# Patient Record
Sex: FEMALE | Race: WHITE | URBAN - METROPOLITAN AREA
[De-identification: names, ages, dates, MRNs, and addresses within clinical notes are randomized per-mention and may not be internally consistent; named-entity substitution may affect disease eponyms.]

---

## 2017-01-10 ENCOUNTER — CLINICAL SUPPORT (OUTPATIENT)
Dept: PRIMARY CARE CLINIC | Age: 22
End: 2017-01-10

## 2017-01-10 DIAGNOSIS — Z11.1 PPD SCREENING TEST: Primary | ICD-10-CM

## 2017-01-10 NOTE — PROGRESS NOTES
VORB, per Gabi Flores; after obtaining consent form from pt, PPD/TB injection given at 0.1ml into L antecubital subq. Administered by LPN RR. Pt advised to return to clinic for ppd reading no later than 48hrs. Pt verbalized understanding of information discussed.

## 2017-01-10 NOTE — LETTER
1/12/2017 1:35 PM 
 
Ms. Sobia Brown [address] [place] [address].[phone] Lovetta Lesch came in for a PPD placement on 1/10/17. 0.1 ml of Tuberculin solution was placed in the left antecubital space. Elva came back in for PPD reading on 1/12/17 at 1:35PM and the reading was negative. Please call the office if you have any questions at [phone] Sincerely, 
 
 
Cuba Ayala, NP

## 2017-01-12 LAB
MM INDURATION POC: 0 MM (ref 0–5)
PPD POC: NEGATIVE NEGATIVE

## 2017-04-06 ENCOUNTER — OFFICE VISIT (OUTPATIENT)
Dept: PRIMARY CARE CLINIC | Age: 22
End: 2017-04-06

## 2017-04-06 VITALS
BODY MASS INDEX: 22.38 KG/M2 | SYSTOLIC BLOOD PRESSURE: 110 MMHG | WEIGHT: 111 LBS | TEMPERATURE: 97.5 F | HEART RATE: 89 BPM | DIASTOLIC BLOOD PRESSURE: 74 MMHG | RESPIRATION RATE: 18 BRPM | OXYGEN SATURATION: 98 % | HEIGHT: 59 IN

## 2017-04-06 DIAGNOSIS — N92.6 MISSED MENSES: Primary | ICD-10-CM

## 2017-04-06 LAB
HCG URINE, QL. (POC): NEGATIVE
VALID INTERNAL CONTROL?: YES

## 2017-04-06 RX ORDER — DROSPIRENONE AND ETHINYL ESTRADIOL 0.02-3(28)
1 KIT ORAL DAILY
COMMUNITY
End: 2019-04-05 | Stop reason: SDUPTHER

## 2017-04-06 NOTE — MR AVS SNAPSHOT
Visit Information Date & Time Provider Department Dept. Phone Encounter #  
 4/6/2017  7:30  [facility] , [address] [facility] [phone] [id number] Upcoming Health Maintenance Date Due  
 HPV AGE 9Y-34Y (1 of 3 - Female 3 Dose Series) 10/24/2004 DTaP/Tdap/Td series (1 - Tdap) 10/24/2014 PAP AKA CERVICAL CYTOLOGY 10/24/2014 INFLUENZA AGE 9 TO ADULT 8/1/2016 Allergies as of 4/6/2017  Review Complete On: 4/6/2017 By: Erin Palomo MD  
 Not on File Current Immunizations  Never Reviewed Name Date  
 TB Skin Test (PPD) Intradermal 1/10/2017 Not reviewed this visit You Were Diagnosed With   
  
 Codes Comments Missed menses    -  Primary ICD-10-CM: N92.6 ICD-9-CM: 626.4 Generalized abdominal pain     ICD-10-CM: R10.84 ICD-9-CM: 789.07 Vitals BP Pulse Temp Resp Height(growth percentile) Weight(growth percentile) 110/74 (BP Patient Position: Sitting) 89 97.5 °F (36.4 °C) (Oral) 18 4' 11\" (1.499 m) 111 lb (50.3 kg) LMP SpO2 BMI OB Status Smoking Status 03/06/2017 98% 22.42 kg/m2 Having regular periods Never Smoker BMI and BSA Data Body Mass Index Body Surface Area  
 22.42 kg/m 2 1.45 m 2 Preferred Pharmacy Pharmacy Name Phone [facility] [address] [place] [address] [address] [facility], [place] [address] [phone] Your Updated Medication List  
  
   
This list is accurate as of: 4/6/17  7:52 PM.  Always use your most recent med list.  
  
  
  
  
 Ash Fuchsr (28) 3-0.02 mg Tab Generic drug:  drospirenone-ethinyl estradiol Take  by mouth daily. We Performed the Following AMB POC URINE PREGNANCY TEST, VISUAL COLOR COMPARISON [78847 CPT(R)] HCG QL SERUM [18029 CPT(R)] Patient Instructions Pregnancy Test (HCG): About This Test 
What is it? A pregnancy test can check if the hormone hCG is in your blood or urine. Your body makes this hormone when you are pregnant. Many women use home pregnancy tests to find out if they are pregnant. This care sheet focuses on a pregnancy test done in a doctor's office or clinic. Why is this test done? This test will show if you are pregnant or not. A urine test checks if hCG is in your urine. A blood test can also measure the amount of hCG. If you are pregnant, your doctor will use the test results as a guide to care for you and your growing baby. How can you prepare for the test? 
You don't need to do anything before the test. 
What happens during the test? 
A urine or blood test for pregnancy can be done in your doctor's office, clinic, or lab. · Blood test: A health professional takes a sample of your blood. · Urine test: You catch urine in a cup given to you by a health professional. When you are finished, you give the cup back. What else should you know about the test? 
· This test may not show that you are pregnant if you are very early in your pregnancy. · If your tests show you aren't pregnant, but you or your doctor thinks you may be too early in your pregnancy, you may need another test. 
How long does the test take? · The test will take just a few minutes. · Results from a urine test may be available right away. Blood test results may take a few days. What happens after the test? 
· You can go back to your usual activities right away. If you are pregnant, you will get more information from your doctor. Follow-up care is a key part of your treatment and safety. Be sure to make and go to all appointments, and call your doctor if you are having problems. It's also a good idea to keep a list of the medicines you take. Ask your doctor when you can expect to have your test results. Where can you learn more? Go to http://steffen-al.info/.  
Enter D128 in the search box to learn more about \"Pregnancy Test (HCG): About This Test.\" 
 Current as of: May 30, 2016 Content Version: 11.2 © 6812-6685 [employer]. Care instructions adapted under license by [employer] (which disclaims liability or warranty for this information). If you have questions about a medical condition or this instruction, always ask your healthcare professional. [address] any warranty or liability for your use of this information. Learning About Pregnancy Your Care Instructions Your health in the early weeks of your pregnancy is particularly important for your babys health. Take good care of yourself. Anything you do that harms your body can also harm your baby. Make sure to go to all of your doctor appointments. Regular checkups will help keep you and your baby healthy. Follow-up care is a key part of your treatment and safety. Be sure to make and go to all appointments, and call your doctor if you are having problems. Its also a good idea to know your test results and keep a list of the medicines you take. How can you care for yourself at home? Diet · Eat a balanced diet. Make sure your diet includes plenty of beans, peas, and leafy green vegetables. · Do not skip meals or go for many hours without eating. If you are nauseated, try to eat a small, healthy snack every 2 to 3 hours. · Do not eat fish that has a high level of mercury, such as shark, swordfish, or mackerel. Do not eat more than one can of tuna each week. · Drink plenty of fluids, enough so that your urine is light yellow or clear like water. If you have kidney, heart, or liver disease and have to limit fluids, talk with your doctor before you increase the amount of fluids you drink. · Cut down on caffeine, such as coffee, tea, and cola. · Do not drink alcohol, such as beer, wine, or hard liquor.  
· Take a multivitamin that contains at least 400 micrograms (mcg) of folic acid to help prevent birth defects. Fortified cereal and whole wheat bread are good additional sources of folic acid. · Increase the calcium in your diet. Try to drink a quart of skim milk each day. You may also take calcium supplements and choose foods such as cheese and yogurt. Lifestyle · Make sure you go to your follow-up appointments. · Get plenty of rest. You may be unusually tired while you are pregnant. · Get at least 30 minutes of exercise on most days of the week. Walking is a good choice. If you have not exercised in the past, start out slowly. Take several short walks each day. · Do not smoke. If you need help quitting, talk to your doctor about stop-smoking programs. These can increase your chances of quitting for good. · Do not touch cat feces or litter boxes. Also, wash your hands after you handle raw meat, and fully cook all meat before you eat it. Wear gloves when you work in the yard or garden, and wash your hands well when you are done. Cat feces, raw or undercooked meat, and contaminated dirt can cause an infection that may harm your baby or lead to a miscarriage. · Do not use saunas or hot tubs. Raising your body temperature may harm your baby. · Avoid chemical fumes, paint fumes, or poisons. · Do not use illegal drugs or alcohol. Medicines · Review all of your medicines with your doctor. Some of your routine medicines may need to be changed to protect your baby. · Use acetaminophen (Tylenol) to relieve minor problems, such as a mild headache or backache or a mild fever with cold symptoms. Do not use nonsteroidal anti-inflammatory drugs (NSAIDs), such as ibuprofen (Advil, Motrin) or naproxen (Aleve), unless your doctor says it is okay. · Do not take two or more pain medicines at the same time unless the doctor told you to. Many pain medicines have acetaminophen, which is Tylenol. Too much acetaminophen (Tylenol) can be harmful. · Take your medicines exactly as prescribed. Call your doctor if you think you are having a problem with your medicine. To manage morning sickness · If you feel sick when you first wake up, try eating a small snack (such as crackers) before you get out of bed. Allow some time to digest the snack, and then get out of bed slowly. · Do not skip meals or go for long periods without eating. An empty stomach can make nausea worse. · Eat small, frequent meals instead of three large meals each day. · Drink plenty of fluids. Sports drinks, such as Gatorade or Powerade, are good choices. · Eat foods that are high in protein but low in fat. · If you are taking iron supplements, ask your doctor if they are necessary. Iron can make nausea worse. · Avoid any smells, such as coffee, that make you feel sick. · Get lots of rest. Morning sickness may be worse when you are tired. Where can you learn more? Go to http://steffen-al.info/. Enter K438 in the search box to learn more about \"Learning About Pregnancy. \" Current as of: May 30, 2016 Content Version: 11.2 © 6961-6810 [employer]. Care instructions adapted under license by [employer] (which disclaims liability or warranty for this information). If you have questions about a medical condition or this instruction, always ask your healthcare professional. [address] any warranty or liability for your use of this information. Introducing [facility]! Sherrie Smith introduces [facility] patient portal. Now you can access parts of your medical record, email your doctor's office, and request medication refills online. 1. In your internet browser, go to [email or web address] 2. Click on the First Time User? Click Here link in the Sign In box. You will see the New Member Sign Up page. 3. Enter your [facility] Access Code exactly as it appears below.  You will not need to use this code after youve completed the sign-up process. If you do not sign up before the expiration date, you must request a new code. · [facility] Access Code: [email or web address] Expires: 7/5/2017  7:45 PM 
 
4. Enter the last four digits of your Social Security Number (xxxx) and Date of Birth (mm/dd/yyyy) as indicated and click Submit. You will be taken to the next sign-up page. 5. Create a [facility] ID. This will be your [facility] login ID and cannot be changed, so think of one that is secure and easy to remember. 6. Create a [facility] password. You can change your password at any time. 7. Enter your Password Reset Question and Answer. This can be used at a later time if you forget your password. 8. Enter your e-mail address. You will receive e-mail notification when new information is available in [address]. 9. Click Sign Up. You can now view and download portions of your medical record. 10. Click the Download Summary menu link to download a portable copy of your medical information. If you have questions, please visit the Frequently Asked Questions section of the [employer] website. Remember, [facility] is NOT to be used for urgent needs. For medical emergencies, dial 911. Now available from your iPhone and Android! Please provide this summary of care documentation to your next provider. If you have any questions after today's visit, please call [phone].

## 2017-04-06 NOTE — PROGRESS NOTES
Subjective:     Haydee Snyder is a 19 y.o. female who is being seen today for possible pregnancy due to missed menses. She noticed swollen breasts, abdominal cramps, bloating, fatigue, nausea for 2 weeks. LMP was 3/6 and usually regularly. She is on OCPs but had unprotected sex before starting her current pack. She is , had a miscarriage 2 yrs ago. She is not taking any prenatal vitamins. Objective:     Blood pressure 110/74, pulse 89, temperature 97.5 °F (36.4 °C), temperature source Oral, resp. rate 18, height 4' 11\" (1.499 m), weight 111 lb (50.3 kg), last menstrual period 2017, SpO2 98 %. General - NAD, alert and oriented  Cardiovascular - RRR, normal S1 and S2 no murmur, gallops or  rubs  Respiratory - CTAB, no wheezes, rales or rhonchi  Abdominal - positive bowel sounds, soft, non-tender, no rebound, guarding  Extremities - no edema, cyanosis, 2+ peripheral pulses      Assessment:       ICD-10-CM ICD-9-CM    1. Missed menses N92.6 626.4 AMB POC URINE PREGNANCY TEST, VISUAL COLOR COMPARISON      drospirenone-ethinyl estradiol (LORYNA, 28,) 3-0.02 mg tab      HCG QL SERUM     UPT negative in office. Discussed that early urine test may be falsely negative. Offered to repeat in one week but patient requested serum confirmation. Advised to start prenatal vitamins while awaiting confirmation.

## 2017-04-06 NOTE — PATIENT INSTRUCTIONS
Pregnancy Test (HCG): About This Test  What is it? A pregnancy test can check if the hormone hCG is in your blood or urine. Your body makes this hormone when you are pregnant. Many women use home pregnancy tests to find out if they are pregnant. This care sheet focuses on a pregnancy test done in a doctor's office or clinic. Why is this test done? This test will show if you are pregnant or not. A urine test checks if hCG is in your urine. A blood test can also measure the amount of hCG. If you are pregnant, your doctor will use the test results as a guide to care for you and your growing baby. How can you prepare for the test?  You don't need to do anything before the test.  What happens during the test?  A urine or blood test for pregnancy can be done in your doctor's office, clinic, or lab. · Blood test: A health professional takes a sample of your blood. · Urine test: You catch urine in a cup given to you by a health professional. When you are finished, you give the cup back. What else should you know about the test?  · This test may not show that you are pregnant if you are very early in your pregnancy. · If your tests show you aren't pregnant, but you or your doctor thinks you may be too early in your pregnancy, you may need another test.  How long does the test take? · The test will take just a few minutes. · Results from a urine test may be available right away. Blood test results may take a few days. What happens after the test?  · You can go back to your usual activities right away. If you are pregnant, you will get more information from your doctor. Follow-up care is a key part of your treatment and safety. Be sure to make and go to all appointments, and call your doctor if you are having problems. It's also a good idea to keep a list of the medicines you take. Ask your doctor when you can expect to have your test results. Where can you learn more?   Go to http://steffen-al.info/. Enter D128 in the search box to learn more about \"Pregnancy Test (HCG): About This Test.\"  Current as of: May 30, 2016  Content Version: 11.2  © 8568-6325 [employer]. Care instructions adapted under license by [employer] (which disclaims liability or warranty for this information). If you have questions about a medical condition or this instruction, always ask your healthcare professional. [address] any warranty or liability for your use of this information. Learning About Pregnancy  Your Care Instructions  Your health in the early weeks of your pregnancy is particularly important for your babys health. Take good care of yourself. Anything you do that harms your body can also harm your baby. Make sure to go to all of your doctor appointments. Regular checkups will help keep you and your baby healthy. Follow-up care is a key part of your treatment and safety. Be sure to make and go to all appointments, and call your doctor if you are having problems. Its also a good idea to know your test results and keep a list of the medicines you take. How can you care for yourself at home? Diet  · Eat a balanced diet. Make sure your diet includes plenty of beans, peas, and leafy green vegetables. · Do not skip meals or go for many hours without eating. If you are nauseated, try to eat a small, healthy snack every 2 to 3 hours. · Do not eat fish that has a high level of mercury, such as shark, swordfish, or mackerel. Do not eat more than one can of tuna each week. · Drink plenty of fluids, enough so that your urine is light yellow or clear like water. If you have kidney, heart, or liver disease and have to limit fluids, talk with your doctor before you increase the amount of fluids you drink. · Cut down on caffeine, such as coffee, tea, and cola. · Do not drink alcohol, such as beer, wine, or hard liquor.   · Take a multivitamin that contains at least 400 micrograms (mcg) of folic acid to help prevent birth defects. Fortified cereal and whole wheat bread are good additional sources of folic acid. · Increase the calcium in your diet. Try to drink a quart of skim milk each day. You may also take calcium supplements and choose foods such as cheese and yogurt. Lifestyle  · Make sure you go to your follow-up appointments. · Get plenty of rest. You may be unusually tired while you are pregnant. · Get at least 30 minutes of exercise on most days of the week. Walking is a good choice. If you have not exercised in the past, start out slowly. Take several short walks each day. · Do not smoke. If you need help quitting, talk to your doctor about stop-smoking programs. These can increase your chances of quitting for good. · Do not touch cat feces or litter boxes. Also, wash your hands after you handle raw meat, and fully cook all meat before you eat it. Wear gloves when you work in the yard or garden, and wash your hands well when you are done. Cat feces, raw or undercooked meat, and contaminated dirt can cause an infection that may harm your baby or lead to a miscarriage. · Do not use saunas or hot tubs. Raising your body temperature may harm your baby. · Avoid chemical fumes, paint fumes, or poisons. · Do not use illegal drugs or alcohol. Medicines  · Review all of your medicines with your doctor. Some of your routine medicines may need to be changed to protect your baby. · Use acetaminophen (Tylenol) to relieve minor problems, such as a mild headache or backache or a mild fever with cold symptoms. Do not use nonsteroidal anti-inflammatory drugs (NSAIDs), such as ibuprofen (Advil, Motrin) or naproxen (Aleve), unless your doctor says it is okay. · Do not take two or more pain medicines at the same time unless the doctor told you to. Many pain medicines have acetaminophen, which is Tylenol.  Too much acetaminophen (Tylenol) can be harmful. · Take your medicines exactly as prescribed. Call your doctor if you think you are having a problem with your medicine. To manage morning sickness  · If you feel sick when you first wake up, try eating a small snack (such as crackers) before you get out of bed. Allow some time to digest the snack, and then get out of bed slowly. · Do not skip meals or go for long periods without eating. An empty stomach can make nausea worse. · Eat small, frequent meals instead of three large meals each day. · Drink plenty of fluids. Sports drinks, such as Gatorade or Powerade, are good choices. · Eat foods that are high in protein but low in fat. · If you are taking iron supplements, ask your doctor if they are necessary. Iron can make nausea worse. · Avoid any smells, such as coffee, that make you feel sick. · Get lots of rest. Morning sickness may be worse when you are tired. Where can you learn more? Go to http://steffen-al.info/. Enter Q950 in the search box to learn more about \"Learning About Pregnancy. \"  Current as of: May 30, 2016  Content Version: 11.2  © 7101-1848 [employer]. Care instructions adapted under license by [employer] (which disclaims liability or warranty for this information). If you have questions about a medical condition or this instruction, always ask your healthcare professional. [address] any warranty or liability for your use of this information.

## 2017-04-08 LAB — B-HCG SERPL QL: NEGATIVE MIU/ML

## 2017-04-10 NOTE — PROGRESS NOTES
Called and spoke with pt x 2 identifiers,advised of negative pregnancy test, voiced understanding and had no further questions at this time

## 2017-07-10 ENCOUNTER — OFFICE VISIT (OUTPATIENT)
Dept: PRIMARY CARE CLINIC | Age: 22
End: 2017-07-10

## 2017-07-10 VITALS
WEIGHT: 114.2 LBS | RESPIRATION RATE: 16 BRPM | HEART RATE: 85 BPM | DIASTOLIC BLOOD PRESSURE: 71 MMHG | TEMPERATURE: 99.4 F | SYSTOLIC BLOOD PRESSURE: 107 MMHG | BODY MASS INDEX: 23.97 KG/M2 | OXYGEN SATURATION: 98 % | HEIGHT: 58 IN

## 2017-07-10 DIAGNOSIS — N30.01 ACUTE CYSTITIS WITH HEMATURIA: Primary | ICD-10-CM

## 2017-07-10 LAB
BILIRUB UR QL STRIP: NEGATIVE
GLUCOSE UR-MCNC: NEGATIVE MG/DL
HCG URINE, QL. (POC): NEGATIVE
KETONES P FAST UR STRIP-MCNC: NEGATIVE MG/DL
PH UR STRIP: 7 [PH] (ref 4.6–8)
PROT UR QL STRIP: NEGATIVE MG/DL
SP GR UR STRIP: 1.01 (ref 1–1.03)
UA UROBILINOGEN AMB POC: NORMAL (ref 0.2–1)
URINALYSIS CLARITY POC: CLEAR
URINALYSIS COLOR POC: YELLOW
URINE BLOOD POC: NORMAL
URINE LEUKOCYTES POC: NORMAL
URINE NITRITES POC: NEGATIVE
VALID INTERNAL CONTROL?: YES

## 2017-07-10 RX ORDER — SULFAMETHOXAZOLE AND TRIMETHOPRIM 800; 160 MG/1; MG/1
1 TABLET ORAL 2 TIMES DAILY
Qty: 14 TAB | Refills: 0 | Status: SHIPPED | OUTPATIENT
Start: 2017-07-10 | End: 2017-07-17

## 2017-07-10 RX ORDER — VARENICLINE TARTRATE 0.5 (11)-1
KIT ORAL
Refills: 0 | COMMUNITY
Start: 2017-05-24 | End: 2017-07-10 | Stop reason: ALTCHOICE

## 2017-07-10 NOTE — MR AVS SNAPSHOT
Visit Information Date & Time Provider Department Dept. Phone Encounter #  
 7/10/2017  5:45 PM Marti Von, [phone] [id number] Follow-up Instructions Return if symptoms worsen or fail to improve. Upcoming Health Maintenance Date Due  
 HPV AGE 9Y-34Y (1 of 3 - Female 3 Dose Series) 10/24/2004 DTaP/Tdap/Td series (1 - Tdap) 10/24/2014 PAP AKA CERVICAL CYTOLOGY 10/24/2014 INFLUENZA AGE 9 TO ADULT 8/1/2017 Allergies as of 7/10/2017  Review Complete On: 7/10/2017 By: Anjana Young LPN No Known Allergies Current Immunizations  Never Reviewed Name Date  
 TB Skin Test (PPD) Intradermal 1/10/2017 Not reviewed this visit You Were Diagnosed With   
  
 Codes Comments Acute cystitis with hematuria    -  Primary ICD-10-CM: N30.01 
ICD-9-CM: 595.0 Vitals BP Pulse Temp Resp Height(growth percentile) Weight(growth percentile) 107/71 (BP 1 Location: Left arm, BP Patient Position: Sitting) 85 99.4 °F (37.4 °C) (Oral) 16 4' 10\" (1.473 m) 114 lb 3.2 oz (51.8 kg) LMP SpO2 BMI OB Status Smoking Status 06/10/2017 98% 23.87 kg/m2 Having regular periods Never Smoker Vitals History BMI and BSA Data Body Mass Index Body Surface Area  
 23.87 kg/m 2 1.46 m 2 Preferred Pharmacy Pharmacy Name Phone [phone] [phone] Your Updated Medication List  
  
   
This list is accurate as of: 7/10/17  7:02 PM.  Always use your most recent med list.  
  
  
  
  
 Chrissy Taveras (26) 3-0.02 mg Tab Generic drug:  drospirenone-ethinyl estradiol Take 1 Tab by mouth daily. trimethoprim-sulfamethoxazole 160-800 mg per tablet Commonly known as:  BACTRIM DS, SEPTRA DS Take 1 Tab by mouth two (2) times a day for 7 days. Prescriptions Sent to Pharmacy Refills trimethoprim-sulfamethoxazole (BACTRIM DS, SEPTRA DS) 160-800 mg per tablet 0 Sig: Take 1 Tab by mouth two (2) times a day for 7 days. Class: Normal  
 Pharmacy: [place] [id number], [address]  #: [phone] Route: Oral  
  
We Performed the Following REFERRAL TO PRIMARY CARE [JGS577 CPT(R)] Comments:  
 Evaluate for PCP Care Follow-up Instructions Return if symptoms worsen or fail to improve. Referral Information Referral ID Referred By Referred To  
  
 [id number], [address] [facility]MD [place]. [address] [place], [facility] [address] [facility] Phone: [phone] Fax: [phone] Visits Status Start Date End Date 1 New Request 7/10/17 7/10/18 If your referral has a status of pending review or denied, additional information will be sent to support the outcome of this decision. Patient Instructions Urinary Tract Infection in Women: Care Instructions Your Care Instructions A urinary tract infection, or UTI, is a general term for an infection anywhere between the kidneys and the urethra (where urine comes out). Most UTIs are bladder infections. They often cause pain or burning when you urinate. UTIs are caused by bacteria and can be cured with antibiotics. Be sure to complete your treatment so that the infection goes away. Follow-up care is a key part of your treatment and safety. Be sure to make and go to all appointments, and call your doctor if you are having problems. It's also a good idea to know your test results and keep a list of the medicines you take. How can you care for yourself at home? · Take your antibiotics as directed. Do not stop taking them just because you feel better. You need to take the full course of antibiotics. · Drink extra water and other fluids for the next day or two.  This may help wash out the bacteria that are causing the infection. (If you have kidney, heart, or liver disease and have to limit fluids, talk with your doctor before you increase your fluid intake.) · Avoid drinks that are carbonated or have caffeine. They can irritate the bladder. · Urinate often. Try to empty your bladder each time. · To relieve pain, take a hot bath or lay a heating pad set on low over your lower belly or genital area. Never go to sleep with a heating pad in place. To prevent UTIs · Drink plenty of water each day. This helps you urinate often, which clears bacteria from your system. (If you have kidney, heart, or liver disease and have to limit fluids, talk with your doctor before you increase your fluid intake.) · Urinate when you need to. · Urinate right after you have sex. · Change sanitary pads often. · Avoid douches, bubble baths, feminine hygiene sprays, and other feminine hygiene products that have deodorants. · After going to the bathroom, wipe from front to back. When should you call for help? Call your doctor now or seek immediate medical care if: · Symptoms such as fever, chills, nausea, or vomiting get worse or appear for the first time. · You have new pain in your back just below your rib cage. This is called flank pain. · There is new blood or pus in your urine. · You have any problems with your antibiotic medicine. Watch closely for changes in your health, and be sure to contact your doctor if: 
· You are not getting better after taking an antibiotic for 2 days. · Your symptoms go away but then come back. Where can you learn more? Go to http://steffen-al.info/. Enter D518 in the search box to learn more about \"Urinary Tract Infection in Women: Care Instructions. \" Current as of: November 28, 2016 Content Version: 11.3 © 9493-7312 [employer], [employer].  Care instructions adapted under license by [address] (which disclaims liability or warranty for this information). If you have questions about a medical condition or this instruction, always ask your healthcare professional. [address] any warranty or liability for your use of this information. Introducing [facility]! Willadean Saint introduces [employer] patient portal. Now you can access parts of your medical record, email your doctor's office, and request medication refills online. 1. In your internet browser, go to [email or web address] 2. Click on the First Time User? Click Here link in the Sign In box. You will see the New Member Sign Up page. 3. Enter your [employer] Access Code exactly as it appears below. You will not need to use this code after youve completed the sign-up process. If you do not sign up before the expiration date, you must request a new code. · [employer] Access Code: [email or web address] Expires: 10/8/2017  7:02 PM 
 
4. Enter the last four digits of your Social Security Number (xxxx) and Date of Birth (mm/dd/yyyy) as indicated and click Submit. You will be taken to the next sign-up page. 5. Create a [employer] ID. This will be your [employer] login ID and cannot be changed, so think of one that is secure and easy to remember. 6. Create a [employer] password. You can change your password at any time. 7. Enter your Password Reset Question and Answer. This can be used at a later time if you forget your password. 8. Enter your e-mail address. You will receive e-mail notification when new information is available in [address] 9. Click Sign Up. You can now view and download portions of your medical record. 10. Click the Download Summary menu link to download a portable copy of your medical information. If you have questions, please visit the Frequently Asked Questions section of the [employer] website.  Remember, [employer] is NOT to be used for urgent needs. For medical emergencies, dial 911. Now available from your iPhone and Android! Please provide this summary of care documentation to your next provider. If you have any questions after today's visit, please call [phone].

## 2017-07-10 NOTE — PROGRESS NOTES
Chief Complaint   Patient presents with    Urinary Burning     c/o dysuria, nausea and some L lower abdominal pain since last tuesday, states she has been drinking fluids to help with symptom relief, denies taking anything     Nausea     This note will not be viewable in [address]

## 2017-07-10 NOTE — PROGRESS NOTES
Subjective:     Kenya Chavez is a 19 y.o. female who complains of dysuria, frequency, urgency for 6 days. Patient denies flank pain, vomiting, fever, unusual vaginal discharge. Patient does not have a history of recurrent UTI. Patient does not have a history of pyelonephritis. There is no problem list on file for this patient. There are no active problems to display for this patient. Current Outpatient Prescriptions   Medication Sig Dispense Refill    trimethoprim-sulfamethoxazole (BACTRIM DS, SEPTRA DS) 160-800 mg per tablet Take 1 Tab by mouth two (2) times a day for 7 days. 14 Tab 0    drospirenone-ethinyl estradiol (LORYNA, 28,) 3-0.02 mg tab Take 1 Tab by mouth daily. No Known Allergies  History reviewed. No pertinent past medical history. History reviewed. No pertinent surgical history. History reviewed. No pertinent family history. Social History   Substance Use Topics    Smoking status: Never Smoker    Smokeless tobacco: Never Used    Alcohol use No        Review of Systems  Pertinent items are noted in HPI. Objective:     Visit Vitals    /71 (BP 1 Location: Left arm, BP Patient Position: Sitting)    Pulse 85    Temp 99.4 °F (37.4 °C) (Oral)    Resp 16    Ht 4' 10\" (1.473 m)    Wt 114 lb 3.2 oz (51.8 kg)    LMP 06/10/2017    SpO2 98%    BMI 23.87 kg/m2     General:  alert, cooperative, no distress   Abdomen: soft, nontender, nondistended, no masses or organomegaly. Back:  CVA tenderness absent   :  defer exam     Laboratory:   Urine dipstick shows positive for RBC's and positive for leukocytes. Micro exam: not done. Assessment/Plan:     Acute cystitis     1. TMP/SMX  2. Maintain adequate hydration  3. May use OTC pyridium as desired, which will turn urine orange/red color  4. Follow up if symptoms not improving, and prn. ICD-10-CM ICD-9-CM    1.  Acute cystitis with hematuria N30.01 595.0 trimethoprim-sulfamethoxazole (BACTRIM DS, SEPTRA DS) 160-800 mg per tablet      REFERRAL TO PRIMARY CARE   .

## 2017-07-10 NOTE — PATIENT INSTRUCTIONS
Urinary Tract Infection in Women: Care Instructions  Your Care Instructions    A urinary tract infection, or UTI, is a general term for an infection anywhere between the kidneys and the urethra (where urine comes out). Most UTIs are bladder infections. They often cause pain or burning when you urinate. UTIs are caused by bacteria and can be cured with antibiotics. Be sure to complete your treatment so that the infection goes away. Follow-up care is a key part of your treatment and safety. Be sure to make and go to all appointments, and call your doctor if you are having problems. It's also a good idea to know your test results and keep a list of the medicines you take. How can you care for yourself at home? · Take your antibiotics as directed. Do not stop taking them just because you feel better. You need to take the full course of antibiotics. · Drink extra water and other fluids for the next day or two. This may help wash out the bacteria that are causing the infection. (If you have kidney, heart, or liver disease and have to limit fluids, talk with your doctor before you increase your fluid intake.)  · Avoid drinks that are carbonated or have caffeine. They can irritate the bladder. · Urinate often. Try to empty your bladder each time. · To relieve pain, take a hot bath or lay a heating pad set on low over your lower belly or genital area. Never go to sleep with a heating pad in place. To prevent UTIs  · Drink plenty of water each day. This helps you urinate often, which clears bacteria from your system. (If you have kidney, heart, or liver disease and have to limit fluids, talk with your doctor before you increase your fluid intake.)  · Urinate when you need to. · Urinate right after you have sex. · Change sanitary pads often. · Avoid douches, bubble baths, feminine hygiene sprays, and other feminine hygiene products that have deodorants.   · After going to the bathroom, wipe from front to back.  When should you call for help? Call your doctor now or seek immediate medical care if:  · Symptoms such as fever, chills, nausea, or vomiting get worse or appear for the first time. · You have new pain in your back just below your rib cage. This is called flank pain. · There is new blood or pus in your urine. · You have any problems with your antibiotic medicine. Watch closely for changes in your health, and be sure to contact your doctor if:  · You are not getting better after taking an antibiotic for 2 days. · Your symptoms go away but then come back. Where can you learn more? Go to http://steffen-al.info/. Enter X420 in the search box to learn more about \"Urinary Tract Infection in Women: Care Instructions. \"  Current as of: November 28, 2016  Content Version: 11.3  © 6209-0831 [email or web address], [employer]. Care instructions adapted under license by [employer] (which disclaims liability or warranty for this information). If you have questions about a medical condition or this instruction, always ask your healthcare professional. [address] any warranty or liability for your use of this information.

## 2017-07-14 LAB — BACTERIA UR CULT: ABNORMAL

## 2019-02-04 ENCOUNTER — OFFICE VISIT (OUTPATIENT)
Dept: PRIMARY CARE CLINIC | Age: 24
End: 2019-02-04

## 2019-02-04 VITALS
BODY MASS INDEX: 24.01 KG/M2 | HEART RATE: 91 BPM | SYSTOLIC BLOOD PRESSURE: 100 MMHG | TEMPERATURE: 98.2 F | RESPIRATION RATE: 16 BRPM | DIASTOLIC BLOOD PRESSURE: 58 MMHG | HEIGHT: 58 IN | OXYGEN SATURATION: 98 % | WEIGHT: 114.4 LBS

## 2019-02-04 DIAGNOSIS — Z23 ENCOUNTER FOR IMMUNIZATION: ICD-10-CM

## 2019-02-04 DIAGNOSIS — Z02.0 SCHOOL PHYSICAL EXAM: Primary | ICD-10-CM

## 2019-02-04 NOTE — PROGRESS NOTES
Subjective:  
20 y.o. female for Well Woman Check. Her gyne and breast care is done elsewhere by her Ob-Gyne physician. There is no problem list on file for this patient. There are no active problems to display for this patient. Current Outpatient Medications Medication Sig Dispense Refill  drospirenone-ethinyl estradiol (LORYNA, 28,) 3-0.02 mg tab Take 1 Tab by mouth daily. No Known Allergies History reviewed. No pertinent past medical history. History reviewed. No pertinent surgical history. Family History Problem Relation Age of Onset  Suicide Mother  No Known Problems Father Social History Tobacco Use  Smoking status: Never Smoker  Smokeless tobacco: Never Used Substance Use Topics  Alcohol use: No  
  
 
 
 
ROS: Feeling generally well. No TIA's or unusual headaches, no dysphagia. No prolonged cough. No dyspnea or chest pain on exertion. No abdominal pain, change in bowel habits, black or bloody stools. No urinary tract symptoms. No new or unusual musculoskeletal symptoms. Specific concerns today: . Objective: The patient appears well, alert, oriented x 3, in no distress. Visit Vitals /58 Pulse 91 Temp 98.2 °F (36.8 °C) (Oral) Resp 16 Ht 4' 10\" (1.473 m) Wt 114 lb 6.4 oz (51.9 kg) LMP 01/21/2019 (Approximate) SpO2 98% BMI 23.91 kg/m² ENT normal.  Neck supple. No adenopathy or thyromegaly. ANDREW. Lungs are clear, good air entry, no wheezes, rhonchi or rales. S1 and S2 normal, no murmurs, regular rate and rhythm. Abdomen soft without tenderness, guarding, mass or organomegaly. Extremities show no edema, normal peripheral pulses. Neurological is normal, no focal findings. Breast and Pelvic exams are deferred. Assessment/Plan:  
 
follow low fat diet, follow low salt diet ICD-10-CM ICD-9-CM 1. School physical exam Z02.0 V70.5

## 2019-02-04 NOTE — PROGRESS NOTES
Chief Complaint Patient presents with  School/Camp Physical  
  Here for Nursing School Physical

## 2019-02-04 NOTE — PATIENT INSTRUCTIONS
Well Visit, Ages 25 to 48: Care Instructions Your Care Instructions Physical exams can help you stay healthy. Your doctor has checked your overall health and may have suggested ways to take good care of yourself. He or she also may have recommended tests. At home, you can help prevent illness with healthy eating, regular exercise, and other steps. Follow-up care is a key part of your treatment and safety. Be sure to make and go to all appointments, and call your doctor if you are having problems. It's also a good idea to know your test results and keep a list of the medicines you take. How can you care for yourself at home? · Reach and stay at a healthy weight. This will lower your risk for many problems, such as obesity, diabetes, heart disease, and high blood pressure. · Get at least 30 minutes of physical activity on most days of the week. Walking is a good choice. You also may want to do other activities, such as running, swimming, cycling, or playing tennis or team sports. Discuss any changes in your exercise program with your doctor. · Do not smoke or allow others to smoke around you. If you need help quitting, talk to your doctor about stop-smoking programs and medicines. These can increase your chances of quitting for good. · Talk to your doctor about whether you have any risk factors for sexually transmitted infections (STIs). Having one sex partner (who does not have STIs and does not have sex with anyone else) is a good way to avoid these infections. · Use birth control if you do not want to have children at this time. Talk with your doctor about the choices available and what might be best for you. · Protect your skin from too much sun. When you're outdoors from 10 a.m. to 4 p.m., stay in the shade or cover up with clothing and a hat with a wide brim. Wear sunglasses that block UV rays. Even when it's cloudy, put broad-spectrum sunscreen (SPF 30 or higher) on any exposed skin. · See a dentist one or two times a year for checkups and to have your teeth cleaned. · Wear a seat belt in the car. · Drink alcohol in moderation, if at all. That means no more than 2 drinks a day for men and 1 drink a day for women. Follow your doctor's advice about when to have certain tests. These tests can spot problems early. For everyone · Cholesterol. Have the fat (cholesterol) in your blood tested after age 21. Your doctor will tell you how often to have this done based on your age, family history, or other things that can increase your risk for heart disease. · Blood pressure. Have your blood pressure checked during a routine doctor visit. Your doctor will tell you how often to check your blood pressure based on your age, your blood pressure results, and other factors. · Vision. Talk with your doctor about how often to have a glaucoma test. 
· Diabetes. Ask your doctor whether you should have tests for diabetes. · Colon cancer. Have a test for colon cancer at age 48. You may have one of several tests. If you are younger than 48, you may need a test earlier if you have any risk factors. Risk factors include whether you already had a precancerous polyp removed from your colon or whether your parent, brother, sister, or child has had colon cancer. For women · Breast exam and mammogram. Talk to your doctor about when you should have a clinical breast exam and a mammogram. Medical experts differ on whether and how often women under 50 should have these tests. Your doctor can help you decide what is right for you. · Pap test and pelvic exam. Begin Pap tests at age 24. A Pap test is the best way to find cervical cancer. The test often is part of a pelvic exam. Ask how often to have this test. 
· Tests for sexually transmitted infections (STIs). Ask whether you should have tests for STIs. You may be at risk if you have sex with more than one person, especially if your partners do not wear condoms. For men · Tests for sexually transmitted infections (STIs). Ask whether you should have tests for STIs. You may be at risk if you have sex with more than one person, especially if you do not wear a condom. · Testicular cancer exam. Ask your doctor whether you should check your testicles regularly. · Prostate exam. Talk to your doctor about whether you should have a blood test (called a PSA test) for prostate cancer. Experts differ on whether and when men should have this test. Some experts suggest it if you are older than 39 and are -American or have a father or brother who got prostate cancer when he was younger than 72. When should you call for help? Watch closely for changes in your health, and be sure to contact your doctor if you have any problems or symptoms that concern you. Where can you learn more? Go to http://steffen-al.info/. Enter P072 in the search box to learn more about \"Well Visit, Ages 25 to 48: Care Instructions. \" Current as of: March 28, 2018 Content Version: 11.9 © 2716-3175 [employer]. Care instructions adapted under license by [employer] (which disclaims liability or warranty for this information). If you have questions about a medical condition or this instruction, always ask your healthcare professional. [place] [address] any warranty or liability for your use of this information. Vaccine Information Statement Tdap (Tetanus, Diphtheria, Pertussis) Vaccine: What You Need to Know Many Vaccine Information Statements are available in [place] and other languages. See www.immunize.org/vis. Hojas de Información Sobre Vacunas están disponibles en español y en muchos otros idiomas. Visite WorthScale.si 1. Why get vaccinated? Tetanus, diphtheria, and pertussis are very serious diseases. Tdap vaccine can protect us from these diseases.   And, Tdap vaccine given to pregnant women can protect  babies against pertussis. TETANUS (Lockjaw) is rare in the [facility] today. It causes painful muscle tightening and stiffness, usually all over the body. ? It can lead to tightening of muscles in the head and neck so you cant open your mouth, swallow, or sometimes even breathe. Tetanus kills about 1 out of 10 people who are infected even after receiving the best medical care. DIPHTHERIA is also rare in the [facility] today. It can cause a thick coating to form in the back of the throat. ? It can lead to breathing problems, heart failure, paralysis, and death. PERTUSSIS (Whooping Cough) causes severe coughing spells, which can cause difficulty breathing, vomiting, and disturbed sleep. ? It can also lead to weight loss, incontinence, and rib fractures. Up to 2 in 100 adolescents and 5 in 100 adults with pertussis are hospitalized or have complications, which could include pneumonia or death. These diseases are caused by bacteria. Diphtheria and pertussis are spread from person to person through secretions from coughing or sneezing. Tetanus enters the body through cuts, scratches, or wounds. Before vaccines, as many as 200,000 cases of diphtheria, 200,000 cases of pertussis, and hundreds of cases of tetanus, were reported in the [place] each year. Since vaccination began, reports of cases for tetanus and diphtheria have dropped by about 99% and for pertussis by about 80%. 2. Tdap vaccine Tdap vaccine can protect adolescents and adults from tetanus, diphtheria, and pertussis. One dose of Tdap is routinely given at age 6 or 15. People who did not get Tdap at that age should get it as soon as possible. Tdap is especially important for health care professionals and anyone having close contact with a baby younger than 12 months.    
 
Pregnant women should get a dose of Tdap during every pregnancy, to protect the  from pertussis. Infants are most at risk for severe, life-threatening complications from pertussis. Another vaccine, called Td, protects against tetanus and diphtheria, but not pertussis. A Td booster should be given every 10 years. Tdap may be given as one of these boosters if you have never gotten Tdap before. Tdap may also be given after a severe cut or burn to prevent tetanus infection. Your doctor or the person giving you the vaccine can give you more information. Tdap may safely be given at the same time as other vaccines. 3. Some people should not get this vaccine  A person who has ever had a life-threatening allergic reaction after a previous dose of any diphtheria, tetanus or pertussis containing vaccine, OR has a severe allergy to any part of this vaccine, should not get Tdap vaccine. Tell the person giving the vaccine about any severe allergies.  Anyone who had coma or long repeated seizures within 7 days after a childhood dose of DTP or DTaP, or a previous dose of Tdap, should not get Tdap, unless a cause other than the vaccine was found. They can still get Td.  Talk to your doctor if you: 
- have seizures or another nervous system problem, 
- had severe pain or swelling after any vaccine containing diphtheria, tetanus or pertussis,  
- ever had a condition called Guillain Barré Syndrome (GBS), 
- arent feeling well on the day the shot is scheduled. 4. Risks With any medicine, including vaccines, there is a chance of side effects. These are usually mild and go away on their own. Serious reactions are also possible but are rare. Most people who get Tdap vaccine do not have any problems with it. Mild Problems following Tdap 
(Did not interfere with activities)  Pain where the shot was given (about 3 in 4 adolescents or 2 in 3 adults)  Redness or swelling where the shot was given (about 1 person in 5)  Mild fever of at least 100.4°F (up to about 1 in 25 adolescents or 1 in 100 adults)  Headache (about 3 or 4 people in 10)  Tiredness (about 1 person in 3 or 4)  Nausea, vomiting, diarrhea, stomach ache (up to 1 in 4 adolescents or 1 in 10 adults)  Chills,  sore joints (about 1 person in 10)  Body aches (about 1 person in 3 or 4)  Rash, swollen glands (uncommon) Moderate Problems following Tdap (Interfered with activities, but did not require medical attention)  Pain where the shot was given (up to 1 in 5 or 6)  Redness or swelling where the shot was given (up to about 1 in 16 adolescents or 1 in 12 adults)  Fever over 102°F (about 1 in 100 adolescents or 1 in 250 adults)  Headache (about 1 in 7 adolescents or 1 in 10 adults)  Nausea, vomiting, diarrhea, stomach ache (up to 1 or 3 people in 100)  Swelling of the entire arm where the shot was given (up to about 1 in 500). Severe Problems following Tdap 
(Unable to perform usual activities; required medical attention)  Swelling, severe pain, bleeding, and redness in the arm where the shot was given (rare). Problems that could happen after any vaccine:  People sometimes faint after a medical procedure, including vaccination. Sitting or lying down for about 15 minutes can help prevent fainting, and injuries caused by a fall. Tell your doctor if you feel dizzy, or have vision changes or ringing in the ears.  Some people get severe pain in the shoulder and have difficulty moving the arm where a shot was given. This happens very rarely.  Any medication can cause a severe allergic reaction. Such reactions from a vaccine are very rare, estimated at fewer than 1 in a million doses, and would happen within a few minutes to a few hours after the vaccination. As with any medicine, there is a very remote chance of a vaccine causing a serious injury or death. The safety of vaccines is always being monitored. For more information, visit: www.cdc.gov/vaccinesafety/ 
 
 
The [facility] Vaccine Injury Compensation Program (VICP) is a federal program that was created to compensate people who may have been injured by certain vaccines. Persons who believe they may have been injured by a vaccine can learn about the program and about filing a claim by calling 5-833.873.9551 or visiting the [address] website at www.[facility].gov/vaccinecompensation. There is a time limit to file a claim for compensation. 7. How can I learn more?  Ask your doctor. He or she can give you the vaccine package insert or suggest other sources of information.  Call your local or state health department.  Contact the Centers for Disease Control and Prevention (CDC): 
- Call 3-994.992.1792 (1-800-CDC-INFO) or 
- Visit CDCs website at www.cdc.gov/vaccines Vaccine Information Statement Tdap Vaccine 
(2/24/2015) 42 . Governor Zig[place] [id number] Department of Health and [employer] Centers for Disease Control and Prevention Office Use Only

## 2019-02-06 LAB
MM INDURATION POC: 0 MM (ref 0–5)
PPD POC: YES NEGATIVE

## 2019-02-09 ENCOUNTER — OFFICE VISIT (OUTPATIENT)
Dept: PRIMARY CARE CLINIC | Age: 24
End: 2019-02-09

## 2019-02-09 VITALS
DIASTOLIC BLOOD PRESSURE: 74 MMHG | BODY MASS INDEX: 23.3 KG/M2 | OXYGEN SATURATION: 97 % | HEART RATE: 105 BPM | TEMPERATURE: 97.5 F | SYSTOLIC BLOOD PRESSURE: 108 MMHG | RESPIRATION RATE: 16 BRPM | HEIGHT: 58 IN | WEIGHT: 111 LBS

## 2019-02-09 DIAGNOSIS — R11.0 NAUSEA: Primary | ICD-10-CM

## 2019-02-09 LAB
HCG URINE, QL. (POC): NEGATIVE
VALID INTERNAL CONTROL?: YES

## 2019-02-09 RX ORDER — ONDANSETRON 4 MG/1
4 TABLET, ORALLY DISINTEGRATING ORAL
Qty: 5 TAB | Refills: 0 | Status: SHIPPED | OUTPATIENT
Start: 2019-02-09 | End: 2019-04-05

## 2019-02-09 NOTE — PATIENT INSTRUCTIONS
Nausea and Vomiting: Care Instructions  Your Care Instructions    When you are nauseated, you may feel weak and sweaty and notice a lot of saliva in your mouth. Nausea often leads to vomiting. Most of the time you do not need to worry about nausea and vomiting, but they can be signs of other illnesses. Two common causes of nausea and vomiting are stomach flu and food poisoning. Nausea and vomiting from viral stomach flu will usually start to improve within 24 hours. Nausea and vomiting from food poisoning may last from 12 to 48 hours. The doctor has checked you carefully, but problems can develop later. If you notice any problems or new symptoms, get medical treatment right away. Follow-up care is a key part of your treatment and safety. Be sure to make and go to all appointments, and call your doctor if you are having problems. It's also a good idea to know your test results and keep a list of the medicines you take. How can you care for yourself at home? · To prevent dehydration, drink plenty of fluids, enough so that your urine is light yellow or clear like water. Choose water and other caffeine-free clear liquids until you feel better. If you have kidney, heart, or liver disease and have to limit fluids, talk with your doctor before you increase the amount of fluids you drink. · Rest in bed until you feel better. · When you are able to eat, try clear soups, mild foods, and liquids until all symptoms are gone for 12 to 48 hours. Other good choices include dry toast, crackers, cooked cereal, and gelatin dessert, such as Jell-O. When should you call for help? Call 911 anytime you think you may need emergency care. For example, call if:    · You passed out (lost consciousness).    Call your doctor now or seek immediate medical care if:    · You have symptoms of dehydration, such as:  ? Dry eyes and a dry mouth. ? Passing only a little dark urine. ?  Feeling thirstier than usual.     · You have new or worsening belly pain.     · You have a new or higher fever.     · You vomit blood or what looks like coffee grounds.    Watch closely for changes in your health, and be sure to contact your doctor if:    · You have ongoing nausea and vomiting.     · Your vomiting is getting worse.     · Your vomiting lasts longer than 2 days.     · You are not getting better as expected. Where can you learn more? Go to http://steffen-al.info/. Enter 25 [id number] in the search box to learn more about \"Nausea and Vomiting: Care Instructions. \"  Current as of: September 23, 2018  Content Version: 11.9  © 6334-7175 "[employer]. Care instructions adapted under license by [employer] (which disclaims liability or warranty for this information). If you have questions about a medical condition or this instruction, always ask your healthcare professional. [address] any warranty or liability for your use of this information.

## 2019-02-09 NOTE — PROGRESS NOTES
Nausea / Vomiting  Patient complains of nausea and vomiting. Onset of symptoms was several days ago. Patient describes nausea as none at this time. Vomiting has occurred 0 times over the past hours. Vomitus is described as dry heaves. Symptoms have been associated with no diffuclty keeping fluids or solid food down. Patient denies hematemesis, melena, fever, possibility of pregnancy, alcohol overuse. Course to date has been stable, well controlled, basically asymptomatic and essentially resolved. Evaluation to date has been none. Treatment to date has been none. Ashley Fay is a 20y.o. year old female who presents for evaluation of nausea, denies pregnancy, requested work note for missing work today      Reviewed PmHx, RxHx, FmHx, SocHx, AllgHx and updated and dated in the chart. Review of Systems - negative except as listed above in the HPI    Objective:     Vitals:    02/09/19 1055   BP: 108/74   Pulse: (!) 105   Resp: 16   Temp: 97.5 °F (36.4 °C)   TempSrc: Oral   SpO2: 97%   Weight: 111 lb (50.3 kg)   Height: 4' 10\" (1.473 m)       Current Outpatient Medications   Medication Sig    ondansetron (ZOFRAN ODT) 4 mg disintegrating tablet Take 1 Tab by mouth every eight (8) hours as needed for Nausea.  drospirenone-ethinyl estradiol (LORYNA, 28,) 3-0.02 mg tab Take 1 Tab by mouth daily. No current facility-administered medications for this visit. Physical Examination: . Physical Exam      Assessment/ Plan:   Diagnoses and all orders for this visit:    1. Nausea  -     AMB POC URINE PREGNANCY TEST, VISUAL COLOR COMPARISON  -     ondansetron (ZOFRAN ODT) 4 mg disintegrating tablet; Take 1 Tab by mouth every eight (8) hours as needed for Nausea. Follow-up Disposition: Not on File    I have discussed the diagnosis with the patient and the intended plan as seen in the above orders. The patient has received an after-visit summary and questions were answered concerning future plans. Pt conveyed understanding of plan.     Medication Side Effects and Warnings were discussed with patient      Franco Davis NP

## 2019-02-09 NOTE — LETTER
NOTIFICATION RETURN TO WORK / SCHOOL 
 
2/9/2019 11:21 AM 
 
Ms. Bisi Miller [address] [place] [address] 1 [phone] To Whom It May Concern: 
 
Bisi Miller is currently under the care of [address] [place] [address]. She will return to work/school on: 2/9/19 If there are questions or concerns please have the patient contact our office.  
 
 
 
Sincerely, 
 
 
Vidal Nelson NP

## 2019-02-09 NOTE — PROGRESS NOTES
Chief Complaint   Patient presents with    Nausea    Breast pain     Nausea since yesterday, left breast tenderness x 1 week.

## 2019-02-11 ENCOUNTER — CLINICAL SUPPORT (OUTPATIENT)
Dept: PRIMARY CARE CLINIC | Age: 24
End: 2019-02-11

## 2019-02-15 ENCOUNTER — CLINICAL SUPPORT (OUTPATIENT)
Dept: PRIMARY CARE CLINIC | Age: 24
End: 2019-02-15

## 2019-02-15 DIAGNOSIS — Z23 ENCOUNTER FOR IMMUNIZATION: Primary | ICD-10-CM

## 2019-02-15 NOTE — PROGRESS NOTES
Chief Complaint   Patient presents with    Immunization/Injection     Pt received Hep B immunization to left deltoid. Pt tolerated injection without difficulty. Pt advised that  titers are  required in 6 weeks, pt verbalized understanding.

## 2019-04-05 ENCOUNTER — OFFICE VISIT (OUTPATIENT)
Dept: PRIMARY CARE CLINIC | Age: 24
End: 2019-04-05

## 2019-04-05 VITALS
OXYGEN SATURATION: 98 % | WEIGHT: 113 LBS | HEART RATE: 81 BPM | TEMPERATURE: 98.2 F | DIASTOLIC BLOOD PRESSURE: 80 MMHG | SYSTOLIC BLOOD PRESSURE: 119 MMHG | BODY MASS INDEX: 23.72 KG/M2 | HEIGHT: 58 IN | RESPIRATION RATE: 18 BRPM

## 2019-04-05 DIAGNOSIS — R00.2 PALPITATIONS: Primary | ICD-10-CM

## 2019-04-05 RX ORDER — DROSPIRENONE AND ETHINYL ESTRADIOL 0.02-3(28)
KIT ORAL
COMMUNITY
Start: 2016-04-10 | End: 2019-09-19

## 2019-04-05 NOTE — PATIENT INSTRUCTIONS
Palpitations: Care Instructions Your Care Instructions Heart palpitations are the uncomfortable sensation that your heart is beating fast or irregularly. You might feel pounding or fluttering in your chest. It might feel like your heart is skipping a beat. Although palpitations may be caused by a heart problem, they also occur because of stress, fatigue, or use of alcohol, caffeine, or nicotine. Many medicines, including diet pills, antihistamines, decongestants, and some herbal products, can cause heart palpitations. Nearly everyone has palpitations from time to time. Depending on your symptoms, your doctor may need to do more tests to try to find the cause of your palpitations. Follow-up care is a key part of your treatment and safety. Be sure to make and go to all appointments, and call your doctor if you are having problems. It's also a good idea to know your test results and keep a list of the medicines you take. How can you care for yourself at home? · Avoid caffeine, nicotine, and excess alcohol. · Do not take illegal drugs, such as methamphetamines and cocaine. · Do not take weight loss or diet medicines unless you talk with your doctor first. 
· Get plenty of sleep. · Do not overeat. · If you have palpitations again, take deep breaths and try to relax. This may slow a racing heart. · If you start to feel lightheaded, lie down to avoid injuries that might result if you pass out and fall down. · Keep a record of your palpitations and bring it to your next doctor's appointment. Write down: ? The date and time. ? Your pulse. (If your heart is beating fast, it may be hard to count your pulse.) ? What you were doing when the palpitations started. ? How long the palpitations lasted. ? Any other symptoms. · If an activity causes palpitations, slow down or stop. Talk to your doctor before you do that activity again. · Take your medicines exactly as prescribed.  Call your doctor if you think you are having a problem with your medicine. When should you call for help? Call 911 anytime you think you may need emergency care. For example, call if: 
  · You passed out (lost consciousness).  
  · You have symptoms of a heart attack. These may include: 
? Chest pain or pressure, or a strange feeling in the chest. 
? Sweating. ? Shortness of breath. ? Pain, pressure, or a strange feeling in the back, neck, jaw, or upper belly or in one or both shoulders or arms. ? Lightheadedness or sudden weakness. ? A fast or irregular heartbeat. After you call 911, the  may tell you to chew 1 adult-strength or 2 to 4 low-dose aspirin. Wait for an ambulance. Do not try to drive yourself.  
  · You have symptoms of a stroke. These may include: 
? Sudden numbness, tingling, weakness, or loss of movement in your face, arm, or leg, especially on only one side of your body. ? Sudden vision changes. ? Sudden trouble speaking. ? Sudden confusion or trouble understanding simple statements. ? Sudden problems with walking or balance. ? A sudden, severe headache that is different from past headaches.  
 Call your doctor now or seek immediate medical care if: 
  · You have heart palpitations and: ? Are dizzy or lightheaded, or you feel like you may faint. ? Have new or increased shortness of breath.  
 Watch closely for changes in your health, and be sure to contact your doctor if: 
  · You continue to have heart palpitations. Where can you learn more? Go to http://steffen-al.info/. Enter R508 in the search box to learn more about \"Palpitations: Care Instructions. \" Current as of: July 22, 2018 Content Version: 11.9 © 3910-3151 [employer]. Care instructions adapted under license by [employer] (which disclaims liability or warranty for this information).  If you have questions about a medical condition or this instruction, always ask your healthcare professional. [place] [address] any warranty or liability for your use of this information.

## 2019-04-05 NOTE — PROGRESS NOTES
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

## 2019-04-05 NOTE — PROGRESS NOTES
No chief complaint on file. she is a 20y.o. year old female who presents for evalution. She has had painless palpitations without shortness of breath several times over the last week and she is concerned. She has a history of anxiety however she does not feel that she has been anxious lately. She does notice caffeine causes the palpitations to be worse and she has decreased her consumption. She denies any cardiac history, chest pain, shortness of breath, dizziness. Reviewed PmHx, RxHx, FmHx, SocHx, AllgHx and updated and dated in the chart. Review of Systems - negative except as listed above in the HPI Objective:  
 
Vitals:  
 04/05/19 1751 BP: 119/80 Pulse: 81 Resp: 18 Temp: 98.2 °F (36.8 °C) TempSrc: Oral  
SpO2: 98% Weight: 113 lb (51.3 kg) Height: 4' 10\" (1.473 m) Current Outpatient Medications Medication Sig  
 drospirenone-ethinyl estradiol (LORYNA, 28,) 3-0.02 mg tab No current facility-administered medications for this visit. Physical Examination: General appearance - alert, well appearing, and in no distress Mental status - alert, oriented to person, place, and time Eyes - pupils equal and reactive, extraocular eye movements intact Ears - bilateral TM's and external ear canals normal 
Nose - normal and patent, no erythema, discharge or polyps Mouth - mucous membranes moist, pharynx normal without lesions Neck - supple, no significant adenopathy Lymphatics - no palpable lymphadenopathy, no hepatosplenomegaly Chest - clear to auscultation, no wheezes, rales or rhonchi, symmetric air entry Heart - normal rate, regular rhythm, normal S1, S2, no murmurs, rubs, clicks or gallops Abdomen - soft, nontender, nondistended, no masses or organomegaly Extremities - peripheral pulses normal, no pedal edema, no clubbing or cyanosis Assessment/ Plan:  
Diagnoses and all orders for this visit: 1. Palpitations I have discussed possible causes and given her written information. I have suggested that she stop all caffeine, see a cardiologist if symptoms continue, go to the ED if she feels chest pain or Shortness of breath. Follow-up and Dispositions · Return if symptoms worsen or fail to improve. I have discussed the diagnosis with the patient and the intended plan as seen in the above orders. The patient has received an after-visit summary and questions were answered concerning future plans. Pt conveyed understanding of plan. Medication Side Effects and Warnings were discussed with patient Renetta Reis, ALEX

## 2019-07-15 ENCOUNTER — TELEPHONE (OUTPATIENT)
Dept: PRIMARY CARE CLINIC | Age: 24
End: 2019-07-15

## 2019-09-12 ENCOUNTER — OFFICE VISIT (OUTPATIENT)
Dept: PRIMARY CARE CLINIC | Age: 24
End: 2019-09-12

## 2019-09-12 VITALS
SYSTOLIC BLOOD PRESSURE: 94 MMHG | TEMPERATURE: 98.1 F | OXYGEN SATURATION: 97 % | BODY MASS INDEX: 23.72 KG/M2 | RESPIRATION RATE: 16 BRPM | HEART RATE: 93 BPM | HEIGHT: 58 IN | WEIGHT: 113 LBS | DIASTOLIC BLOOD PRESSURE: 65 MMHG

## 2019-09-12 DIAGNOSIS — R05.9 COUGH: ICD-10-CM

## 2019-09-12 DIAGNOSIS — J06.9 VIRAL URI: Primary | ICD-10-CM

## 2019-09-12 RX ORDER — LEVOCETIRIZINE DIHYDROCHLORIDE 5 MG/1
5 TABLET, FILM COATED ORAL DAILY
Qty: 30 TAB | Refills: 0 | Status: SHIPPED | OUTPATIENT
Start: 2019-09-12 | End: 2019-10-12

## 2019-09-12 RX ORDER — PREDNISONE 10 MG/1
10 TABLET ORAL SEE ADMIN INSTRUCTIONS
Qty: 21 TAB | Refills: 0 | Status: SHIPPED | OUTPATIENT
Start: 2019-09-12 | End: 2019-09-19

## 2019-09-12 RX ORDER — BENZONATATE 200 MG/1
200 CAPSULE ORAL
Qty: 21 CAP | Refills: 0 | Status: SHIPPED | OUTPATIENT
Start: 2019-09-12 | End: 2019-09-19

## 2019-09-12 NOTE — PATIENT INSTRUCTIONS

## 2019-09-12 NOTE — PROGRESS NOTES
RM 4    Chief Complaint   Patient presents with    Nasal Congestion     chest congestion x 2 weeks     Pt has tried OTC Tylenol cold and flu

## 2019-09-14 NOTE — PROGRESS NOTES
Subjective:   Howard Quezada is a 20 y.o. female who complains of coryza, congestion, sneezing and productive cough for 3 days, unchanged since that time. She denies a history of shortness of breath and wheezing. Evaluation to date: none. Treatment to date: OTC products. Patient does not smoke cigarettes. Relevant PMH: No pertinent additional PMH. There is no problem list on file for this patient. There are no active problems to display for this patient. Current Outpatient Medications   Medication Sig Dispense Refill    predniSONE (STERAPRED DS) 10 mg dose pack Take 1 Tab by mouth See Admin Instructions. See administration instruction per 10mg dose pack 21 Tab 0    levocetirizine (XYZAL) 5 mg tablet Take 1 Tab by mouth daily for 30 days. 30 Tab 0    benzonatate (TESSALON) 200 mg capsule Take 1 Cap by mouth three (3) times daily as needed for Cough for up to 7 days. 21 Cap 0    drospirenone-ethinyl estradiol (LORYNA, 28,) 3-0.02 mg tab        No Known Allergies  No past medical history on file. No past surgical history on file. Family History   Problem Relation Age of Onset   [facility] Suicide Mother     No Known Problems Father      Social History     Tobacco Use    Smoking status: Never Smoker    Smokeless tobacco: Never Used   Substance Use Topics    Alcohol use: No        Review of Systems  Pertinent items are noted in HPI. Objective:     Visit Vitals  BP 94/65 (BP 1 Location: Left arm, BP Patient Position: Sitting)   Pulse 93   Temp 98.1 °F (36.7 °C) (Oral)   Resp 16   Ht 4' 10\" (1.473 m)   Wt 113 lb (51.3 kg)   LMP 08/27/2019   SpO2 97%   BMI 23.62 kg/m²     General:  alert, cooperative, no distress   Eyes: negative, conjunctivae/corneas clear. PERRL, EOM's intact.  Fundi benign   Ears: normal TM's and external ear canals AU   Sinuses: Normal paranasal sinuses without tenderness   Mouth:  Lips, mucosa, and tongue normal. Teeth and gums normal   Neck: supple, symmetrical, trachea midline and no adenopathy. Heart: S1 and S2 normal, no murmurs noted. Lungs: clear to auscultation bilaterally   Abdomen: soft, non-tender. Bowel sounds normal. No masses,  no organomegaly        Assessment/Plan:   viral upper respiratory illness  Discussed dx and tx of URIs  Suggested symptomatic OTC remedies. RTC prn. Discussed diagnosis and treatment of viral URIs. Discussed the importance of avoiding unnecessary antibiotic therapy. ICD-10-CM ICD-9-CM    1. Viral URI J06.9 465.9 predniSONE (STERAPRED DS) 10 mg dose pack      levocetirizine (XYZAL) 5 mg tablet      benzonatate (TESSALON) 200 mg capsule   2. Cough R05 786.2      reviewed medications and side effects in detail. Visit Vitals  BP 94/65 (BP 1 Location: Left arm, BP Patient Position: Sitting)   Pulse 93   Temp 98.1 °F (36.7 °C) (Oral)   Resp 16   Ht 4' 10\" (1.473 m)   Wt 113 lb (51.3 kg)   LMP 08/27/2019   SpO2 97%   BMI 23.62 kg/m²     Spoke with the patient regarding their blood pressure (BP) reading at today's visit. The patient verbalized understanding of need to maintain BP lower than 140/90. The patient will follow up with their primary care physician regarding management and/or medications that may be needed. Drepssion Screening has been completed. The patient isdenies having a history of depression. The patient is nottaking medication and is being followed by their PCP at this time. This patient does  have a primary care physician. Referral was not given a referral at todays visit. Immunizations: The patient is current on their influenza immunization at this time. The patient does not   want to receive the influenza immunization today. Follow up instructions given at today's visit were verbalized by the patient/parent. The signs of infection are fever > 100.4, increase fatigue, change in mental status, or decrease in urinary output.   The patient/parent verbalized understanding of taking medications prescribed during this visit as prescribed.

## 2019-09-19 ENCOUNTER — OFFICE VISIT (OUTPATIENT)
Dept: URGENT CARE | Age: 24
End: 2019-09-19

## 2019-09-19 VITALS
TEMPERATURE: 98.6 F | HEIGHT: 58 IN | DIASTOLIC BLOOD PRESSURE: 80 MMHG | SYSTOLIC BLOOD PRESSURE: 119 MMHG | BODY MASS INDEX: 23.72 KG/M2 | OXYGEN SATURATION: 99 % | HEART RATE: 93 BPM | WEIGHT: 113 LBS | RESPIRATION RATE: 18 BRPM

## 2019-09-19 DIAGNOSIS — R53.83 TIREDNESS: ICD-10-CM

## 2019-09-19 DIAGNOSIS — R39.15 URINARY URGENCY: ICD-10-CM

## 2019-09-19 DIAGNOSIS — R11.0 NAUSEA: Primary | ICD-10-CM

## 2019-09-19 DIAGNOSIS — R35.0 URINARY FREQUENCY: ICD-10-CM

## 2019-09-19 LAB
BILIRUB UR QL STRIP: NEGATIVE
GLUCOSE POC: 107 MG/DL
GLUCOSE UR-MCNC: NEGATIVE MG/DL
HCG URINE, QL. (POC): NEGATIVE
KETONES P FAST UR STRIP-MCNC: NEGATIVE MG/DL
PH UR STRIP: 7.5 [PH] (ref 4.6–8)
PROT UR QL STRIP: NEGATIVE
SP GR UR STRIP: 1.01 (ref 1–1.03)
UA UROBILINOGEN AMB POC: NORMAL (ref 0.2–1)
URINALYSIS CLARITY POC: NORMAL
URINALYSIS COLOR POC: NORMAL
URINE BLOOD POC: NEGATIVE
URINE LEUKOCYTES POC: NORMAL
URINE NITRITES POC: NEGATIVE
VALID INTERNAL CONTROL?: YES

## 2019-09-19 RX ORDER — CEPHALEXIN 500 MG/1
500 CAPSULE ORAL 2 TIMES DAILY
Qty: 14 CAP | Refills: 0 | Status: SHIPPED | OUTPATIENT
Start: 2019-09-19 | End: 2019-09-26

## 2019-09-19 RX ORDER — ONDANSETRON 4 MG/1
4 TABLET, ORALLY DISINTEGRATING ORAL
Qty: 6 TAB | Refills: 0 | Status: SHIPPED | OUTPATIENT
Start: 2019-09-19 | End: 2019-09-21

## 2019-09-19 NOTE — PROGRESS NOTES
Onset of new headache last night. States its a migraine. Improved with home therapies. Today felt tired and fatigued. Generally low energy. Associated symptoms: urinary urgency and frequency onset 2 days ago. Denies any fever, chills, flank pain, abdominal pain, vomiting or diarrhea, pelvic pain or vaginal discharge. She has not tried any particular medications. Overall symptoms continued today. Denies PMH of thyroid disease or diabetes. Recent visits: seen approx 1 week ago for URI symptoms promotes these have completely resolved and denies, cough, SOB, chest pain or wheeze             History reviewed. No pertinent past medical history. History reviewed. No pertinent surgical history.       Family History   Problem Relation Age of Onset   [facility] Suicide Mother     No Known Problems Father         Social History     Socioeconomic History    Marital status:      Spouse name: Not on file    Number of children: Not on file    Years of education: Not on file    Highest education level: Not on file   Occupational History    Not on file   Social Needs    Financial resource strain: Not on file    Food insecurity:     Worry: Not on file     Inability: Not on file    Transportation needs:     Medical: Not on file     Non-medical: Not on file   Tobacco Use    Smoking status: Never Smoker    Smokeless tobacco: Never Used   Substance and Sexual Activity    Alcohol use: No    Drug use: Not on file    Sexual activity: Yes   Lifestyle    Physical activity:     Days per week: Not on file     Minutes per session: Not on file    Stress: Not on file   Relationships    Social connections:     Talks on phone: Not on file     Gets together: Not on file     Attends Confucianist service: Not on file     Active member of club or organization: Not on file     Attends meetings of clubs or organizations: Not on file     Relationship status: Not on file    Intimate partner violence:     Fear of current or ex partner: Not on file     Emotionally abused: Not on file     Physically abused: Not on file     Forced sexual activity: Not on file   Other Topics Concern    Not on file   Social History Narrative    Not on file                ALLERGIES: Patient has no known allergies. Review of Systems   Respiratory: Negative for cough, shortness of breath and wheezing. Neurological: Negative for dizziness and weakness. All other systems reviewed and are negative. Vitals:    09/19/19 1523   BP: 119/80   Pulse: 93   Resp: 18   Temp: 98.6 °F (37 °C)   SpO2: 99%   Weight: 113 lb (51.3 kg)   Height: 4' 10\" (1.473 m)       Physical Exam   Constitutional: She is oriented to person, place, and time. No distress. HENT:   Head: Normocephalic and atraumatic. Mouth/Throat: Oropharynx is clear and moist.   TMs normal bilat   Eyes: Pupils are equal, round, and reactive to light. Conjunctivae and EOM are normal.   Neck: Normal range of motion. Neck supple. No thyromegaly present. Cardiovascular: Normal rate, regular rhythm and normal heart sounds. Exam reveals no gallop and no friction rub. No murmur heard. Pulmonary/Chest: Effort normal and breath sounds normal. No respiratory distress. She has no wheezes. She has no rales. Abdominal: Soft. She exhibits no distension and no mass. There is no tenderness. There is no rebound and no guarding. Musculoskeletal:   No CVA tenderness bilat   Lymphadenopathy:     She has no cervical adenopathy. Neurological: She is alert and oriented to person, place, and time. Skin: She is not diaphoretic. No pallor. Psychiatric: She has a normal mood and affect.  Her behavior is normal. Thought content normal.       [facility]     Differential Diagnosis; Clinical Impression; Plan:       CLINICAL IMPRESSION:  (R11.0) Nausea  (primary encounter diagnosis)  (R53.83) Tiredness  (R35.0) Urinary frequency  (R39.15) Urinary urgency    Orders Placed This Encounter      cephALEXin (KEFLEX) 500 mg capsule Sig: Take 1 Cap by mouth two (2) times a day for 7 days. Dispense:  14 Cap          Refill:  0      ondansetron (ZOFRAN ODT) 4 mg disintegrating tablet          Sig: Take 1 Tab by mouth every eight (8) hours as needed for Nausea for up to 2 days. Dispense:  6 Tab          Refill:  0       Plan:  Maintain adequate fluid intake  zofran for nausea  Start cephalexin for possible UTI while awaiting urine culture; this may be reason for tiredness. Blood sugar was ok in office today. Urine pregnancy negative. If you still feel you are having persistent fatigue/tiredness, it is imortant you see your Primary Care Doctor for further evaluation within the next 1 week    We have reviewed concerning signs/symptoms, normal vs abnormal progression of medical condition and when to seek immediate medical attention. Schedule with PCP or Urgent Care immediately for worsening or new symptoms. You should see your PCP for updates on your routine health maintenance.              Procedures           Results for orders placed or performed in visit on 09/19/19   AMB POC URINALYSIS DIP STICK AUTO W/O MICRO   Result Value Ref Range    Color (UA POC)      Clarity (UA POC)      Glucose (UA POC) Negative Negative    Bilirubin (UA POC) Negative Negative    Ketones (UA POC) Negative Negative    Specific gravity (UA POC) 1.015 1.001 - 1.035    Blood (UA POC) Negative Negative    pH (UA POC) 7.5 4.6 - 8.0    Protein (UA POC) Negative Negative    Urobilinogen (UA POC) 0.2 mg/dL 0.2 - 1    Nitrites (UA POC) Negative Negative    Leukocyte esterase (UA POC) Trace Negative   AMB POC URINE PREGNANCY TEST, VISUAL COLOR COMPARISON   Result Value Ref Range    VALID INTERNAL CONTROL POC Yes     HCG urine, Ql. (POC) Negative Negative   AMB POC GLUCOSE BLOOD, BY GLUCOSE MONITORING DEVICE   Result Value Ref Range    Glucose  mg/dL

## 2019-09-19 NOTE — PATIENT INSTRUCTIONS
Maintain adequate fluid intake  zofran for nausea  Start cephalexin for possible UTI while awaiting urine culture  Blood sugar was ok in office today. Urine pregnancy negative. If you still feel you are having persistent fatigue/tiredness, it is imortant you see your Primary Care Doctor for further evaluation within the next 1 week     Fatigue: Care Instructions  Your Care Instructions    Fatigue is a feeling of tiredness, exhaustion, or lack of energy. You may feel fatigue because of too much or not enough activity. It can also come from stress, lack of sleep, boredom, and poor diet. Many medical problems, such as viral infections, can cause fatigue. Emotional problems, especially depression, are often the cause of fatigue. Fatigue is most often a symptom of another problem. Treatment for fatigue depends on the cause. For example, if you have fatigue because you have a certain health problem, treating this problem also treats your fatigue. If depression or anxiety is the cause, treatment may help. Follow-up care is a key part of your treatment and safety. Be sure to make and go to all appointments, and call your doctor if you are having problems. It's also a good idea to know your test results and keep a list of the medicines you take. How can you care for yourself at home? · Get regular exercise. But don't overdo it. Go back and forth between rest and exercise. · Get plenty of rest.  · Eat a healthy diet. Do not skip meals, especially breakfast.  · Reduce your use of caffeine, tobacco, and alcohol. Caffeine is most often found in coffee, tea, cola drinks, and chocolate. · Limit medicines that can cause fatigue. This includes tranquilizers and cold and allergy medicines. When should you call for help?   Watch closely for changes in your health, and be sure to contact your doctor if:    · You have new symptoms such as fever or a rash.     · Your fatigue gets worse.     · You have been feeling down, depressed, or hopeless. Or you may have lost interest in things that you usually enjoy.     · You are not getting better as expected. Where can you learn more? Go to http://steffen-al.info/. Enter A841 in the search box to learn more about \"Fatigue: Care Instructions. \"  Current as of: September 23, 2018  Content Version: 12.1  © 1454-2148 [employer]. Care instructions adapted under license by [employer] (which disclaims liability or warranty for this information). If you have questions about a medical condition or this instruction, always ask your healthcare professional. [place] [address] any warranty or liability for your use of this information. Urinary Tract Infection in Women: Care Instructions  Your Care Instructions    A urinary tract infection, or UTI, is a general term for an infection anywhere between the kidneys and the urethra (where urine comes out). Most UTIs are bladder infections. They often cause pain or burning when you urinate. UTIs are caused by bacteria and can be cured with antibiotics. Be sure to complete your treatment so that the infection goes away. Follow-up care is a key part of your treatment and safety. Be sure to make and go to all appointments, and call your doctor if you are having problems. It's also a good idea to know your test results and keep a list of the medicines you take. How can you care for yourself at home? · Take your antibiotics as directed. Do not stop taking them just because you feel better. You need to take the full course of antibiotics. · Drink extra water and other fluids for the next day or two. This may help wash out the bacteria that are causing the infection. (If you have kidney, heart, or liver disease and have to limit fluids, talk with your doctor before you increase your fluid intake.)  · Avoid drinks that are carbonated or have caffeine.  They can irritate the bladder. · Urinate often. Try to empty your bladder each time. · To relieve pain, take a hot bath or lay a heating pad set on low over your lower belly or genital area. Never go to sleep with a heating pad in place. To prevent UTIs  · Drink plenty of water each day. This helps you urinate often, which clears bacteria from your system. (If you have kidney, heart, or liver disease and have to limit fluids, talk with your doctor before you increase your fluid intake.)  · Urinate when you need to. · Urinate right after you have sex. · Change sanitary pads often. · Avoid douches, bubble baths, feminine hygiene sprays, and other feminine hygiene products that have deodorants. · After going to the bathroom, wipe from front to back. When should you call for help? Call your doctor now or seek immediate medical care if:    · Symptoms such as fever, chills, nausea, or vomiting get worse or appear for the first time.     · You have new pain in your back just below your rib cage. This is called flank pain.     · There is new blood or pus in your urine.     · You have any problems with your antibiotic medicine.    Watch closely for changes in your health, and be sure to contact your doctor if:    · You are not getting better after taking an antibiotic for 2 days.     · Your symptoms go away but then come back. Where can you learn more? Go to http://steffen-al.info/. Enter B847 in the search box to learn more about \"Urinary Tract Infection in Women: Care Instructions. \"  Current as of: December 19, 2018  Content Version: 12.1  © 0716-7078 [employer]. Care instructions adapted under license by [employer] (which disclaims liability or warranty for this information). If you have questions about a medical condition or this instruction, always ask your healthcare professional. [address] any warranty or liability for your use of this information.

## 2019-09-23 LAB
BACTERIA UR CULT: ABNORMAL
BACTERIA UR CULT: ABNORMAL

## 2019-10-31 ENCOUNTER — CLINICAL SUPPORT (OUTPATIENT)
Dept: PRIMARY CARE CLINIC | Age: 24
End: 2019-10-31

## 2019-10-31 DIAGNOSIS — Z23 ENCOUNTER FOR IMMUNIZATION: Primary | ICD-10-CM
